# Patient Record
Sex: MALE | Race: WHITE | NOT HISPANIC OR LATINO | URBAN - METROPOLITAN AREA
[De-identification: names, ages, dates, MRNs, and addresses within clinical notes are randomized per-mention and may not be internally consistent; named-entity substitution may affect disease eponyms.]

---

## 2024-06-17 ENCOUNTER — APPOINTMENT (OUTPATIENT)
Dept: PRIMARY CARE | Facility: CLINIC | Age: 29
End: 2024-06-17
Payer: COMMERCIAL

## 2024-06-17 VITALS
BODY MASS INDEX: 23.36 KG/M2 | WEIGHT: 182 LBS | HEART RATE: 52 BPM | DIASTOLIC BLOOD PRESSURE: 70 MMHG | HEIGHT: 74 IN | OXYGEN SATURATION: 99 % | SYSTOLIC BLOOD PRESSURE: 120 MMHG | TEMPERATURE: 98.1 F

## 2024-06-17 DIAGNOSIS — Z00.00 ROUTINE GENERAL MEDICAL EXAMINATION AT A HEALTH CARE FACILITY: ICD-10-CM

## 2024-06-17 DIAGNOSIS — Z13.79 GENETIC TESTING: ICD-10-CM

## 2024-06-17 DIAGNOSIS — B07.9 VERRUCA: Primary | ICD-10-CM

## 2024-06-17 DIAGNOSIS — Z23 ENCOUNTER FOR IMMUNIZATION: ICD-10-CM

## 2024-06-17 PROCEDURE — 90471 IMMUNIZATION ADMIN: CPT | Performed by: FAMILY MEDICINE

## 2024-06-17 PROCEDURE — 99385 PREV VISIT NEW AGE 18-39: CPT | Performed by: FAMILY MEDICINE

## 2024-06-17 PROCEDURE — 1036F TOBACCO NON-USER: CPT | Performed by: FAMILY MEDICINE

## 2024-06-17 PROCEDURE — 90715 TDAP VACCINE 7 YRS/> IM: CPT | Performed by: FAMILY MEDICINE

## 2024-06-17 RX ORDER — ATOVAQUONE AND PROGUANIL HYDROCHLORIDE 250; 100 MG/1; MG/1
TABLET, FILM COATED ORAL
COMMUNITY
Start: 2023-07-10 | End: 2024-06-17 | Stop reason: ALTCHOICE

## 2024-06-17 RX ORDER — FLUORIDE (SODIUM) 1.1 %
PASTE (ML) DENTAL
COMMUNITY
Start: 2023-07-05 | End: 2024-06-17 | Stop reason: ALTCHOICE

## 2024-06-17 ASSESSMENT — PATIENT HEALTH QUESTIONNAIRE - PHQ9
SUM OF ALL RESPONSES TO PHQ9 QUESTIONS 1 AND 2: 0
2. FEELING DOWN, DEPRESSED OR HOPELESS: NOT AT ALL
1. LITTLE INTEREST OR PLEASURE IN DOING THINGS: NOT AT ALL

## 2024-06-17 ASSESSMENT — PAIN SCALES - GENERAL: PAINLEVEL: 0-NO PAIN

## 2024-06-17 NOTE — PROGRESS NOTES
Subjective   Patient ID: Pasquale Heredia is a 29 y.o. male who presents for Annual Exam (New patient /Would like to be tested to see if he is a carrier of dorsey syndrome/Also c/o wart on right hand).    HPI patient returns the practice after many years of being away.  He states to go to college and did a mission trip.  He is now back in the area.  He has no chronic medical conditions.  Patient is wife is a carrier for Dorsey syndrome and patient is concerned that he may also be a carrier.  No recent tetanus shot.  Patient does not use tobacco or alcohol.  Review of Systems  Constitutional Symptoms:  He is negative for fever, loss of appetite, headaches, fatigue.   Eyes:  He is negative for loss and blurring of vision, double vision.   Ear, Nose, Mouth, Throat:  He is negative for hearing loss, tinnitus, nasal congestion, rhinorrhea, nose bleeds, teeth problems, mouth sores, gum disease, dysphagia, sore throat.   Cardiovascular:  He is negative for chest pain/pressure, palpitations, edema, claudication.   Respiratory:  He is negative for shortness of breath, dyspnea on exertion, pain with breathing, coughing.   Breast:  He is negative for tenderness, masses, gynecomastia.   Gastrointestinal:  He is negative for anorexia, indigestion, nausea, vomiting, abdominal pain, change in bowel habits, diarrhea, constipation, hematochezia, melena, blood in stool.   Musculoskeletal:  He is negative for joint pain, joint swelling, myalgias, cramps.   Integumentary:  He is negative for change in mole, skin trouble or rash.   Neurological:  He is negative for headache, numbness, tingling, weakness, tremors.   Psychiatric:  He is negative for depression, anxiety.   Endocrine:  He is negative for weight gain, heat or cold intolerance, polyuria, polydipsia, polyphagia.   Hematologic/Lymphatic:  He is negative for bruising, abnormal bleeding, swollen glands.  Objective   /70 (BP Location: Left arm, Patient Position: Sitting)   Pulse 52   " Temp 36.7 °C (98.1 °F) (Temporal)   Ht 1.88 m (6' 2\")   Wt 82.6 kg (182 lb)   SpO2 99%   BMI 23.37 kg/m²     Physical Exam  General: Vitals reviewed , Pt sits comfortably in the exam room table .  He is a pleasant healthy-appearing male .  He is awake alert oriented.   HEENT : Head is normocephalic atraumatic .  Well groomed short cropped curly black hair.  No alopecia noted.    Ears: Normal externally , canals are clear, TMs are pearly gray.   Eyes: Conjunctiva and sclera clear , no icterus , pupils equal round reactive , EOMI without notable nystagmus.   Nose: Nares are open without septal deviation.  Oropharynx: Moist oral mucosa with appropriate dentition.  Neck: Normal to inspection , soft and supple , no swelling asymmetry or adenopathy in the cervical or clavicular chains.   Chest : No barrel chest .   Pulmonary : Clear and vesicular breath sounds in all fields throughout the posterior without wheezing rales or rhonchi .   Cardiovascular : Regular rate and rhythm , normal S1 and S2, no murmurs rubs or gallops .  Carotids are without bruit bilaterally , DP pulses are 2+ , radial pulses are 2+ , there is no edema or clubbing noted .   Abdomen : Slightly protuberant , soft , no tenderness organomegaly guarding or rigidity .   Genitourinary/ anal rectal: Deferred .   Musculoskeletal: No gross arthritic changes , good strength and range of motion in the upper and lower extremities .   Neurologic: Intact and nonfocal, cranial nerves 2-12 are grossly intact , patellar reflexes are 2+ bilaterally.   Integumentary : Has a small wart 3 mm on the pad of the right long finger.  No notable bruising rashes or eruptions , slight dryness along the lower extremities.  The nails are in good repair.  Assessment/Plan   Problem List Items Addressed This Visit    None  Visit Diagnoses         Codes    [place]    -  Primary   needs better control.  Using liquid nitrogen the area was frozen to a halo void. B07.9    Routine " general medical examination at a health care facility    normal exam. Z00.00    Encounter for immunization    needs better control.  Patient received tetanus today. Z23    Relevant Orders    Tdap vaccine, age 7 years and older  (BOOSTRIX)     Genetic testing.  Needs work up.  Pt to be referred to  for possible testing for Jin Syndrome, since his wife is a carrier.